# Patient Record
Sex: MALE | Race: BLACK OR AFRICAN AMERICAN | Employment: FULL TIME | URBAN - METROPOLITAN AREA
[De-identification: names, ages, dates, MRNs, and addresses within clinical notes are randomized per-mention and may not be internally consistent; named-entity substitution may affect disease eponyms.]

---

## 2020-06-26 ENCOUNTER — TELEPHONE (OUTPATIENT)
Dept: ORTHOPEDICS CLINIC | Facility: CLINIC | Age: 45
End: 2020-06-26

## 2020-06-26 ENCOUNTER — APPOINTMENT (OUTPATIENT)
Dept: GENERAL RADIOLOGY | Age: 45
End: 2020-06-26
Attending: FAMILY MEDICINE
Payer: OTHER MISCELLANEOUS

## 2020-06-26 ENCOUNTER — HOSPITAL ENCOUNTER (OUTPATIENT)
Age: 45
Discharge: HOME | End: 2020-06-26
Attending: FAMILY MEDICINE
Payer: OTHER MISCELLANEOUS

## 2020-06-26 VITALS
DIASTOLIC BLOOD PRESSURE: 85 MMHG | RESPIRATION RATE: 20 BRPM | OXYGEN SATURATION: 99 % | SYSTOLIC BLOOD PRESSURE: 153 MMHG | HEART RATE: 76 BPM | TEMPERATURE: 98 F
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
## 2020-06-26 DIAGNOSIS — S89.91XA INJURY OF RIGHT KNEE, INITIAL ENCOUNTER: ICD-10-CM

## 2020-06-26 DIAGNOSIS — M25.561 RIGHT KNEE PAIN, UNSPECIFIED CHRONICITY: Primary | ICD-10-CM

## 2020-06-26 DIAGNOSIS — S99.911A INJURY OF RIGHT ANKLE, INITIAL ENCOUNTER: ICD-10-CM

## 2020-06-26 DIAGNOSIS — M25.561 ACUTE PAIN OF RIGHT KNEE: Primary | ICD-10-CM

## 2020-06-26 DIAGNOSIS — S93.401A SPRAIN OF RIGHT ANKLE, UNSPECIFIED LIGAMENT, INITIAL ENCOUNTER: ICD-10-CM

## 2020-06-26 PROCEDURE — 73610 X-RAY EXAM OF ANKLE: CPT | Performed by: FAMILY MEDICINE

## 2020-06-26 PROCEDURE — 73560 X-RAY EXAM OF KNEE 1 OR 2: CPT | Performed by: FAMILY MEDICINE

## 2020-06-26 PROCEDURE — 99214 OFFICE O/P EST MOD 30 MIN: CPT

## 2020-06-26 PROCEDURE — 99204 OFFICE O/P NEW MOD 45 MIN: CPT

## 2020-06-26 RX ORDER — IBUPROFEN 800 MG/1
800 TABLET ORAL ONCE
Status: COMPLETED | OUTPATIENT
Start: 2020-06-26 | End: 2020-06-26

## 2020-06-26 RX ORDER — DICLOFENAC SODIUM 75 MG/1
75 TABLET, DELAYED RELEASE ORAL 2 TIMES DAILY
Qty: 20 TABLET | Refills: 0 | Status: SHIPPED | OUTPATIENT
Start: 2020-06-26

## 2020-06-26 NOTE — TELEPHONE ENCOUNTER
Patient is being referred for right knee/right ankle injury. Please advise if additional views are required.     Future Appointments   Date Time Provider Jorge Carol   6/29/2020  2:15 PM EVARISTO Veliz [facility]

## 2020-06-26 NOTE — ED PROVIDER NOTES
Patient Seen in: [address] [facility]      History   Patient presents with:  Fall  Lower Extremity Injury    Stated Complaint: WC-R. 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 COMPARISON:  None.   INDICATIONS:  WC-R. Knee and Ankle Injury  PATIENT STATED HISTORY: (As transcribed by Technologist)  Patient states while at work today he slipped on wet grass while carrying a 40 lbs back pack, slid down a hill and twisted r while in the office and crutches and crutch walk training were is also given while in the office. He is given a prescription for Voltaren 75 mg 1 tablet with breakfast and dinner for the next 10 days.   He should follow-up with orthopedics next week for re orthopedics next week for reassessment on your knee and ankle injury. It is possible you may have torn the cartilage in your knee and you may need MRI for further assessment. You are off work until you have been seen by the orthopedic physician.

## 2020-06-26 NOTE — ED INITIAL ASSESSMENT (HPI)
States slipped on wet grass while carrying a 40 lbs back pack, slid down the hill. Injured lateral right ankle and lateral right knee. Wheelchair to room.

## 2020-06-29 ENCOUNTER — OFFICE VISIT (OUTPATIENT)
Dept: ORTHOPEDICS CLINIC | Facility: CLINIC | Age: 45
End: 2020-06-29
Payer: COMMERCIAL

## 2020-06-29 VITALS — RESPIRATION RATE: 16 BRPM | OXYGEN SATURATION: 98 % | HEART RATE: 79 BPM

## 2020-06-29 DIAGNOSIS — S80.01XA CONTUSION OF RIGHT KNEE, INITIAL ENCOUNTER: ICD-10-CM

## 2020-06-29 DIAGNOSIS — IMO0001 GRADE 2 ANKLE SPRAIN, RIGHT, INITIAL ENCOUNTER: Primary | ICD-10-CM

## 2020-06-29 PROCEDURE — 99203 OFFICE O/P NEW LOW 30 MIN: CPT | Performed by: NURSE PRACTITIONER

## 2020-06-29 PROCEDURE — L4387 NON-PNEUM WALK BOOT PRE OTS: HCPCS | Performed by: NURSE PRACTITIONER

## 2020-06-29 RX ORDER — FEXOFENADINE HCL 180 MG/1
180 TABLET ORAL DAILY
COMMUNITY
Start: 2018-06-16

## 2020-06-29 NOTE — PROGRESS NOTES
[facility] New Patient Note    CC: Patient presents with:  Lower Extremity Injury: Reassessment on Rt knee-ankle injury. Was prescribed Diclofenac Sodium 75MG from the ED on 6/26. Uses ace wrap on his ankle.  Patient has been able to ambulate with crut looks stated age no acute distress is alert oriented x3.   Upon evaluation of his right knee shows he has full active range of motion he has no medial lateral joint line tenderness he does have tenderness palpation over the lateral femoral condyle he has no

## 2020-07-31 ENCOUNTER — OFFICE VISIT (OUTPATIENT)
Dept: ORTHOPEDICS CLINIC | Facility: CLINIC | Age: 45
End: 2020-07-31
Payer: COMMERCIAL

## 2020-07-31 VITALS — HEART RATE: 79 BPM | OXYGEN SATURATION: 99 %

## 2020-07-31 DIAGNOSIS — S80.01XA CONTUSION OF RIGHT KNEE, INITIAL ENCOUNTER: ICD-10-CM

## 2020-07-31 DIAGNOSIS — IMO0001 GRADE 2 ANKLE SPRAIN, RIGHT, INITIAL ENCOUNTER: Primary | ICD-10-CM

## 2020-07-31 PROBLEM — J30.9 ALLERGIC RHINITIS: Status: ACTIVE | Noted: 2018-08-13

## 2020-07-31 PROCEDURE — 99213 OFFICE O/P EST LOW 20 MIN: CPT | Performed by: NURSE PRACTITIONER

## 2020-07-31 NOTE — PROGRESS NOTES
[facility] Progress Note    CC: Patient presents with: Follow - Up: right knee and ankle - pt states swelling in both ankles x 4 weeks. HPI: This 50year old male presents today for follow-up on his right ankle.   He states he has been in [address] [place]

## 2020-08-28 ENCOUNTER — TELEPHONE (OUTPATIENT)
Dept: ORTHOPEDICS CLINIC | Facility: CLINIC | Age: 45
End: 2020-08-28

## 2020-08-28 NOTE — TELEPHONE ENCOUNTER
Reached out to pt in regards to missed appointment for today (8/28) at 8:30. Pt states he will not be coming to appointment and did not want to reschedule as he'll be seeing another provider.

## 2020-09-04 ENCOUNTER — HOSPITAL ENCOUNTER (OUTPATIENT)
Age: 45
Discharge: HOME | End: 2020-09-04
Payer: COMMERCIAL

## 2020-09-04 VITALS
TEMPERATURE: 98 F | HEART RATE: 79 BPM | DIASTOLIC BLOOD PRESSURE: 80 MMHG | RESPIRATION RATE: 20 BRPM | OXYGEN SATURATION: 96 % | SYSTOLIC BLOOD PRESSURE: 112 MMHG

## 2020-09-04 DIAGNOSIS — Z20.822 CLOSE EXPOSURE TO COVID-19 VIRUS: Primary | ICD-10-CM

## 2020-09-04 PROCEDURE — 99213 OFFICE O/P EST LOW 20 MIN: CPT | Performed by: PHYSICIAN ASSISTANT

## 2020-09-04 PROCEDURE — U0003 INFECTIOUS AGENT DETECTION BY NUCLEIC ACID (DNA OR RNA); SEVERE ACUTE RESPIRATORY SYNDROME CORONAVIRUS 2 (SARS-COV-2) (CORONAVIRUS DISEASE [COVID-19]), AMPLIFIED PROBE TECHNIQUE, MAKING USE OF HIGH THROUGHPUT TECHNOLOGIES AS DESCRIBED BY CMS-2020-01-R: HCPCS | Performed by: PHYSICIAN ASSISTANT

## 2020-09-04 NOTE — ED PROVIDER NOTES
Patient Seen in[place] [address] [facility] [place]      History   Patient presents with:  Testing    Stated Complaint: exposed to covid    HPI    40-year-old gentleman. Medical history of allergic rhinitis and morbid obesity.   Daughter had cough and diarr Wearing appropriate PPE, nares swab obtained for COVID-19. Quarantine until resulted.   Well-appearing male with normal vital signs              Disposition and Plan     Clinical Impression:  Close exposure to COVID-19 virus  (primary encounter diagnos

## 2020-09-06 LAB — SARS-COV-2 BY PCR: NOT DETECTED

## 2021-07-13 ENCOUNTER — HOSPITAL ENCOUNTER (OUTPATIENT)
Age: 46
Discharge: HOME | End: 2021-07-13
Payer: COMMERCIAL

## 2021-07-13 VITALS
TEMPERATURE: 97 F | DIASTOLIC BLOOD PRESSURE: 73 MMHG | OXYGEN SATURATION: 98 % | RESPIRATION RATE: 16 BRPM | HEART RATE: 85 BPM | SYSTOLIC BLOOD PRESSURE: 125 MMHG

## 2021-07-13 DIAGNOSIS — J30.9 ALLERGIC RHINITIS, UNSPECIFIED SEASONALITY, UNSPECIFIED TRIGGER: Primary | ICD-10-CM

## 2021-07-13 PROCEDURE — 99203 OFFICE O/P NEW LOW 30 MIN: CPT | Performed by: PHYSICIAN ASSISTANT

## 2021-07-13 NOTE — ED PROVIDER NOTES
Patient Seen in: [facility] [address] [place] [address]      History   Patient presents with:  Cough/URI: Entered by patient    Stated Complaint: Allergies/congestion    HPI/Subjective:   HPI    49-year-old male presents to the [address] [place] [address] for evaluation of allergies.   He reports Conjunctiva/sclera: Conjunctivae normal.   Cardiovascular:      Rate and Rhythm: Normal rate and regular rhythm. Pulmonary:      Effort: Pulmonary effort is normal.      Breath sounds: Normal breath sounds.    Musculoskeletal:      Cervical back: Normal

## 2021-07-13 NOTE — ED INITIAL ASSESSMENT (HPI)
Pt with c/o congestion, cough and watery eyes. Pt c/o nasal drainage. Ongoing last few days. Pt taking otc zyrtec but not working.

## 2021-09-05 ENCOUNTER — HOSPITAL ENCOUNTER (OUTPATIENT)
Age: 46
Discharge: HOME | End: 2021-09-05
Payer: COMMERCIAL

## 2021-09-05 VITALS
RESPIRATION RATE: 14 BRPM | TEMPERATURE: 98 F | BODY MASS INDEX: 45.04 KG/M2 | SYSTOLIC BLOOD PRESSURE: 112 MMHG | HEIGHT: 67 IN | OXYGEN SATURATION: 97 % | DIASTOLIC BLOOD PRESSURE: 77 MMHG | HEART RATE: 80 BPM | WEIGHT: 287 LBS

## 2021-09-05 DIAGNOSIS — T63.441A BEE STING, ACCIDENTAL OR UNINTENTIONAL, INITIAL ENCOUNTER: Primary | ICD-10-CM

## 2021-09-05 PROCEDURE — 99203 OFFICE O/P NEW LOW 30 MIN: CPT | Performed by: NURSE PRACTITIONER

## 2021-09-05 RX ORDER — METHYLPREDNISOLONE 4 MG/1
TABLET ORAL
Qty: 1 EACH | Refills: 0 | Status: SHIPPED | OUTPATIENT
Start: 2021-09-05

## 2021-09-05 NOTE — ED PROVIDER NOTES
Patient Seen in: [facility]      History   Patient presents with:  Bite Sting,Insect    Stated Complaint: Hand swelling    HPI/Subjective:   53-year-old male who presents the IC with multiple bee stings yesterday.   Patient states he went to th He is not in acute distress. Appearance: Normal appearance. He is obese. He is not ill-appearing, toxic-appearing or diaphoretic. HENT:      Head: Atraumatic.         Right Ear: External ear normal.      Left Ear: External ear normal.      Nose: Nose 8:25 AM    START taking these medications    methylPREDNISolone (MEDROL) 4 MG Oral Tablet Therapy Pack  Dosepack: take as directed, Normal, Disp-1 each, R-0

## 2021-09-05 NOTE — ED INITIAL ASSESSMENT (HPI)
Patient was stung several times on Friday by bees. Right 5th digit swelling and redness. Pain where he was stung twice in the head.

## 2022-07-01 ENCOUNTER — HOSPITAL ENCOUNTER (OUTPATIENT)
Age: 47
Discharge: HOME | End: 2022-07-01
Payer: COMMERCIAL

## 2022-07-01 VITALS
OXYGEN SATURATION: 99 % | HEART RATE: 84 BPM | HEIGHT: 67 IN | RESPIRATION RATE: 18 BRPM | SYSTOLIC BLOOD PRESSURE: 129 MMHG | TEMPERATURE: 97 F | WEIGHT: 280 LBS | BODY MASS INDEX: 43.95 KG/M2 | DIASTOLIC BLOOD PRESSURE: 89 MMHG

## 2022-07-01 DIAGNOSIS — U07.1 COVID-19: ICD-10-CM

## 2022-07-01 DIAGNOSIS — B34.9 VIRAL ILLNESS: Primary | ICD-10-CM

## 2022-07-01 LAB — SARS-COV-2 RNA RESP QL NAA+PROBE: DETECTED

## 2022-07-01 RX ORDER — NIRMATRELVIR AND RITONAVIR 300-100 MG
KIT ORAL
Qty: 30 TABLET | Refills: 0 | Status: SHIPPED | OUTPATIENT
Start: 2022-07-01 | End: 2022-07-06

## 2022-07-01 RX ORDER — NIRMATRELVIR AND RITONAVIR 300-100 MG
KIT ORAL
Qty: 30 TABLET | Refills: 0 | Status: SHIPPED | OUTPATIENT
Start: 2022-07-01 | End: 2022-07-01

## 2022-07-01 NOTE — ED INITIAL ASSESSMENT (HPI)
Pt with c/o sore throat, chills and body aches for the last 2 days. Pt states does have cough and congestion but has seasonal allergies.   Pt tested positive for covid at home

## 2023-08-20 ENCOUNTER — HOSPITAL ENCOUNTER (OUTPATIENT)
Age: 48
Discharge: HOME | End: 2023-08-20
Payer: COMMERCIAL

## 2023-08-20 VITALS
DIASTOLIC BLOOD PRESSURE: 84 MMHG | HEIGHT: 67 IN | TEMPERATURE: 97 F | HEART RATE: 73 BPM | RESPIRATION RATE: 18 BRPM | SYSTOLIC BLOOD PRESSURE: 137 MMHG | WEIGHT: 285 LBS | BODY MASS INDEX: 44.73 KG/M2 | OXYGEN SATURATION: 98 %

## 2023-08-20 DIAGNOSIS — T63.444A BEE STING REACTION, UNDETERMINED INTENT, INITIAL ENCOUNTER: Primary | ICD-10-CM

## 2023-08-20 RX ORDER — METHYLPREDNISOLONE 4 MG/1
TABLET ORAL
Qty: 1 EACH | Refills: 0 | Status: SHIPPED | OUTPATIENT
Start: 2023-08-20

## 2023-08-20 RX ORDER — SULFAMETHOXAZOLE AND TRIMETHOPRIM 800; 160 MG/1; MG/1
1 TABLET ORAL 2 TIMES DAILY
Qty: 14 TABLET | Refills: 0 | Status: SHIPPED | OUTPATIENT
Start: 2023-08-20 | End: 2023-08-27

## 2023-08-20 NOTE — ED INITIAL ASSESSMENT (HPI)
Patient states he was stung by a bee on his right hand yesterday while at work. Hand is swollen and has a blister on his right 4th digit.

## 2023-08-23 ENCOUNTER — OFFICE VISIT (OUTPATIENT)
Dept: URGENT CARE | Age: 48
End: 2023-08-23

## 2023-08-23 VITALS
HEART RATE: 74 BPM | OXYGEN SATURATION: 100 % | DIASTOLIC BLOOD PRESSURE: 84 MMHG | TEMPERATURE: 96.9 F | SYSTOLIC BLOOD PRESSURE: 128 MMHG | RESPIRATION RATE: 16 BRPM

## 2023-08-23 DIAGNOSIS — T63.441A BEE STING, ACCIDENTAL OR UNINTENTIONAL, INITIAL ENCOUNTER: ICD-10-CM

## 2023-08-23 DIAGNOSIS — L03.90 CELLULITIS, UNSPECIFIED CELLULITIS SITE: Primary | ICD-10-CM

## 2023-08-23 PROCEDURE — 99204 OFFICE O/P NEW MOD 45 MIN: CPT | Performed by: FAMILY MEDICINE

## 2023-08-23 RX ORDER — CLINDAMYCIN HYDROCHLORIDE 300 MG/1
300 CAPSULE ORAL 3 TIMES DAILY
Qty: 30 CAPSULE | Refills: 0 | Status: SHIPPED | OUTPATIENT
Start: 2023-08-23 | End: 2023-09-02

## 2023-08-23 RX ORDER — SULFAMETHOXAZOLE AND TRIMETHOPRIM 800; 160 MG/1; MG/1
1 TABLET ORAL 2 TIMES DAILY
COMMUNITY
Start: 2023-08-20

## 2023-08-23 RX ORDER — METHYLPREDNISOLONE 4 MG/1
TABLET ORAL
COMMUNITY
Start: 2023-08-20

## 2023-09-29 ENCOUNTER — PATIENT OUTREACH (OUTPATIENT)
Dept: CASE MANAGEMENT | Age: 48
End: 2023-09-29

## 2023-09-29 NOTE — PROCEDURES
The office order for PCP removal request is Approved and finalized on September 29, 2023.     Thanks,  [facility] [employer]

## (undated) NOTE — LETTER
07/31/20    To Whom It May Concern:     Henny Velazquez has been evaluated in the office for a scheduled appointment. Please excuse the time missed during this appointment and he is allowed to return to work with restrictions.   Restrictions:   Sitting

## (undated) NOTE — LETTER
Date & Time: 7/1/2022, 9:11 AM  Patient: José Miguel Andino  Encounter Provider(s):    EVARISTO Arias       To Whom It May Concern:    Jacquie Cordova was seen and treated in our department on 7/1/2022. He may return to work after 5 days of quarantine if symptoms have improved and remains fever free for 24 hours. Must wear N95 mask for the next 5 days.     If you have any questions or concerns, please do not hesitate to call.        _____________________________  Physician/APC Signature

## (undated) NOTE — LETTER
Date & Time: 8/20/2023, 1:47 PM  Patient: Deb Solid  Encounter Provider(s):    EVARISTO Sullivan       To Whom It May Concern:    Roxana Lazo was seen and treated in our department on 8/20/2023. He can return to work 08/23/2023 unless otherwise specified.     If you have any questions or concerns, please do not hesitate to call.        _____________________________  Physician/APC Signature

## (undated) NOTE — LETTER
[address] [place] [id number]  Dept: [phone]  Dept Fax: [phone]         June 26, 2020    Patient: Sobia Love   YOB: 1975   Date of Visit: 6/26/2020       To Whom It May Concern:    R